# Patient Record
Sex: MALE | Race: BLACK OR AFRICAN AMERICAN | Employment: FULL TIME | URBAN - METROPOLITAN AREA
[De-identification: names, ages, dates, MRNs, and addresses within clinical notes are randomized per-mention and may not be internally consistent; named-entity substitution may affect disease eponyms.]

---

## 2017-10-02 ENCOUNTER — HOSPITAL ENCOUNTER (EMERGENCY)
Age: 52
Discharge: HOME | End: 2017-10-02
Attending: EMERGENCY MEDICINE | Admitting: EMERGENCY MEDICINE
Payer: OTHER GOVERNMENT

## 2017-10-02 VITALS
RESPIRATION RATE: 14 BRPM | OXYGEN SATURATION: 98 % | BODY MASS INDEX: 28.56 KG/M2 | SYSTOLIC BLOOD PRESSURE: 142 MMHG | HEIGHT: 71 IN | WEIGHT: 204 LBS | TEMPERATURE: 98.5 F | HEART RATE: 71 BPM | DIASTOLIC BLOOD PRESSURE: 77 MMHG

## 2017-10-02 DIAGNOSIS — M54.41 ACUTE RIGHT-SIDED LOW BACK PAIN WITH RIGHT-SIDED SCIATICA: Primary | ICD-10-CM

## 2017-10-02 PROCEDURE — 74011250637 HC RX REV CODE- 250/637: Performed by: EMERGENCY MEDICINE

## 2017-10-02 PROCEDURE — 99283 EMERGENCY DEPT VISIT LOW MDM: CPT

## 2017-10-02 PROCEDURE — 74011250636 HC RX REV CODE- 250/636: Performed by: EMERGENCY MEDICINE

## 2017-10-02 PROCEDURE — 96372 THER/PROPH/DIAG INJ SC/IM: CPT

## 2017-10-02 RX ORDER — DIAZEPAM 5 MG/1
5 TABLET ORAL
Status: COMPLETED | OUTPATIENT
Start: 2017-10-02 | End: 2017-10-02

## 2017-10-02 RX ORDER — NAPROXEN 500 MG/1
500 TABLET ORAL
Qty: 20 TAB | Refills: 0 | Status: SHIPPED | OUTPATIENT
Start: 2017-10-02

## 2017-10-02 RX ORDER — KETOROLAC TROMETHAMINE 30 MG/ML
60 INJECTION, SOLUTION INTRAMUSCULAR; INTRAVENOUS
Status: COMPLETED | OUTPATIENT
Start: 2017-10-02 | End: 2017-10-02

## 2017-10-02 RX ORDER — METHYLPREDNISOLONE 4 MG/1
TABLET ORAL
Qty: 1 DOSE PACK | Refills: 0 | Status: SHIPPED | OUTPATIENT
Start: 2017-10-02

## 2017-10-02 RX ORDER — DIAZEPAM 5 MG/1
5 TABLET ORAL
Qty: 15 TAB | Refills: 0 | Status: SHIPPED | OUTPATIENT
Start: 2017-10-02

## 2017-10-02 RX ADMIN — DIAZEPAM 5 MG: 5 TABLET ORAL at 11:59

## 2017-10-02 RX ADMIN — KETOROLAC TROMETHAMINE 60 MG: 30 INJECTION, SOLUTION INTRAMUSCULAR at 11:59

## 2017-10-02 NOTE — ED NOTES
MD reviewed discharge instructions and options with patient; patient verbalized understanding. Pt. Ambulated to exit without difficulty and in no signs of acute distress. Patient was counseled on medications prescribed at discharge and will follow up as discussed.

## 2017-10-02 NOTE — DISCHARGE INSTRUCTIONS
Back Pain: Care Instructions  Your Care Instructions    Back pain has many possible causes. It is often related to problems with muscles and ligaments of the back. It may also be related to problems with the nerves, discs, or bones of the back. Moving, lifting, standing, sitting, or sleeping in an awkward way can strain the back. Sometimes you don't notice the injury until later. Arthritis is another common cause of back pain. Although it may hurt a lot, back pain usually improves on its own within several weeks. Most people recover in 12 weeks or less. Using good home treatment and being careful not to stress your back can help you feel better sooner. Follow-up care is a key part of your treatment and safety. Be sure to make and go to all appointments, and call your doctor if you are having problems. Its also a good idea to know your test results and keep a list of the medicines you take. How can you care for yourself at home? · Sit or lie in positions that are most comfortable and reduce your pain. Try one of these positions when you lie down:  ¨ Lie on your back with your knees bent and supported by large pillows. ¨ Lie on the floor with your legs on the seat of a sofa or chair. Rommie Filler on your side with your knees and hips bent and a pillow between your legs. ¨ Lie on your stomach if it does not make pain worse. · Do not sit up in bed, and avoid soft couches and twisted positions. Bed rest can help relieve pain at first, but it delays healing. Avoid bed rest after the first day of back pain. · Change positions every 30 minutes. If you must sit for long periods of time, take breaks from sitting. Get up and walk around, or lie in a comfortable position. · Try using a heating pad on a low or medium setting for 15 to 20 minutes every 2 or 3 hours. Try a warm shower in place of one session with the heating pad. · You can also try an ice pack for 10 to 15 minutes every 2 to 3 hours.  Put a thin cloth between the ice pack and your skin. · Take pain medicines exactly as directed. ¨ If the doctor gave you a prescription medicine for pain, take it as prescribed. ¨ If you are not taking a prescription pain medicine, ask your doctor if you can take an over-the-counter medicine. · Take short walks several times a day. You can start with 5 to 10 minutes, 3 or 4 times a day, and work up to longer walks. Walk on level surfaces and avoid hills and stairs until your back is better. · Return to work and other activities as soon as you can. Continued rest without activity is usually not good for your back. · To prevent future back pain, do exercises to stretch and strengthen your back and stomach. Learn how to use good posture, safe lifting techniques, and proper body mechanics. When should you call for help? Call your doctor now or seek immediate medical care if:  · You have new or worsening numbness in your legs. · You have new or worsening weakness in your legs. (This could make it hard to stand up.)  · You lose control of your bladder or bowels. Watch closely for changes in your health, and be sure to contact your doctor if:  · Your pain gets worse. · You are not getting better after 2 weeks. Where can you learn more? Go to http://eduardo-anisa.info/. Enter S029 in the search box to learn more about \"Back Pain: Care Instructions. \"  Current as of: March 21, 2017  Content Version: 11.3  © 2552-7309 [employer]. Care instructions adapted under license by [employer] (which disclaims liability or warranty for this information). If you have questions about a medical condition or this instruction, always ask your healthcare professional. [address] any warranty or liability for your use of this information. Learning About Relief for Back Pain  What is back tension and strain?     Back strain happens when you overstretch, or pull, a muscle in your back. You may hurt your back in an accident or when you exercise or lift something. Most back pain will get better with rest and time. You can take care of yourself at home to help your back heal.  What can you do first to relieve back pain? When you first feel back pain, try these steps:  · Walk. Take a short walk (10 to 20 minutes) on a level surface (no slopes, hills, or stairs) every 2 to 3 hours. Walk only distances you can manage without pain, especially leg pain. · Relax. Find a comfortable position for rest. Some people are comfortable on the floor or a medium-firm bed with a small pillow under their head and another under their knees. Some people prefer to lie on their side with a pillow between their knees. Don't stay in one position for too long. · Try heat or ice. Try using a heating pad on a low or medium setting, or take a warm shower, for 15 to 20 minutes every 2 to 3 hours. Or you can buy single-use heat wraps that last up to 8 hours. You can also try an ice pack for 10 to 15 minutes every 2 to 3 hours. You can use an ice pack or a bag of frozen vegetables wrapped in a thin towel. There is not strong evidence that either heat or ice will help, but you can try them to see if they help. You may also want to try switching between heat and cold. · Take pain medicine exactly as directed. ¨ If the doctor gave you a prescription medicine for pain, take it as prescribed. ¨ If you are not taking a prescription pain medicine, ask your doctor if you can take an over-the-counter medicine. What else can you do? · Stretch and exercise. Exercises that increase flexibility may relieve your pain and make it easier for your muscles to keep your spine in a good, neutral position. And don't forget to keep walking. · Do self-massage. You can use self-massage to unwind after work or school or to energize yourself in the morning. You can easily massage your feet, hands, or neck.  Self-massage works best if you are in comfortable clothes and are sitting or lying in a comfortable position. Use oil or lotion to massage bare skin. · Reduce stress. Back pain can lead to a vicious [place]: Distress about the pain tenses the muscles in your back, which in turn causes more pain. Learn how to relax your mind and your muscles to lower your stress. Where can you learn more? Go to http://eduardo-anisa.info/. Enter O000 in the search box to learn more about \"Learning About Relief for Back Pain. \"  Current as of: March 21, 2017  Content Version: 11.3  © 9831-1515 [employer]. Care instructions adapted under license by [employer] (which disclaims liability or warranty for this information). If you have questions about a medical condition or this instruction, always ask your healthcare professional. [address] any warranty or liability for your use of this information. Back Pain, Emergency or Urgent Symptoms: Care Instructions  Your Care Instructions  Many people have back pain at one time or another. In most cases, pain gets better with self-care that includes over-the-counter pain medicine, ice, heat, and exercises. Unless you have symptoms of a severe injury or heart attack, you may be able to give yourself a few days before you call a doctor. But some back problems are very serious. Do not ignore symptoms that need to be checked right away. Follow-up care is a key part of your treatment and safety. Be sure to make and go to all appointments, and call your doctor if you are having problems. It's also a good idea to know your test results and keep a list of the medicines you take. How can you care for yourself at home? · Sit or lie in positions that are most comfortable and that reduce your pain. Try one of these positions when you lie down:  ¨ Lie on your back with your knees bent and supported by large pillows.   Avis Sauce on the floor with your legs on the seat of a sofa or chair. Emilie Floss on your side with your knees and hips bent and a pillow between your legs. ¨ Lie on your stomach if it does not make pain worse. · Do not sit up in bed, and avoid soft couches and twisted positions. Bed rest can help relieve pain at first, but it delays healing. Avoid bed rest after the first day. · Change positions every 30 minutes. If you must sit for long periods of time, take breaks from sitting. Get up and walk around, or lie flat. · Try using a heating pad on a low or medium setting, for 15 to 20 minutes every 2 or 3 hours. Try a warm shower in place of one session with the heating pad. You can also buy single-use heat wraps that last up to 8 hours. You can also try ice or cold packs on your back for 10 to 20 minutes at a time, several times a day. (Put a thin cloth between the ice pack and your skin.) This reduces pain and makes it easier to be active and exercise. · Take pain medicines exactly as directed. ¨ If the doctor gave you a prescription medicine for pain, take it as prescribed. ¨ If you are not taking a prescription pain medicine, ask your doctor if you can take an over-the-counter medicine. When should you call for help? Call 911 anytime you think you may need emergency care. For example, call if:  · You are unable to move a leg at all. · You have back pain with severe belly pain. · You have symptoms of a heart attack. These may include:  ¨ Chest pain or pressure, or a strange feeling in the chest.  ¨ Sweating. ¨ Shortness of breath. ¨ Nausea or vomiting. ¨ Pain, pressure, or a strange feeling in the back, neck, jaw, or upper belly or in one or both shoulders or arms. ¨ Lightheadedness or sudden weakness. ¨ A fast or irregular heartbeat. After you call 911, the  may tell you to chew 1 adult-strength or 2 to 4 low-dose aspirin. Wait for an ambulance. Do not try to drive yourself.   Call your doctor now or seek immediate medical care if:  · You have new or worse symptoms in your arms, legs, chest, belly, or buttocks. Symptoms may include:  ¨ Numbness or tingling. ¨ Weakness. ¨ Pain. · You lose bladder or bowel control. · You have back pain and:  ¨ You have injured your back while lifting or doing some other activity. Call if the pain is severe, has not gone away after 1 or 2 days, and you cannot do your normal daily activities. ¨ You have had a back injury before that needed treatment. ¨ Your pain has lasted longer than 4 weeks. ¨ You have had weight loss you cannot explain. ¨ You are age 48 or older. ¨ You have cancer now or have had it before. Watch closely for changes in your health, and be sure to contact your doctor if you are not getting better as expected. Where can you learn more? Go to http://eduardoUrova Medicalanisa.info/. Enter T247 in the search box to learn more about \"Back Pain, Emergency or Urgent Symptoms: Care Instructions. \"  Current as of: March 20, 2017  Content Version: 11.3  © 5294-8376 [employer]. Care instructions adapted under license by [employer] (which disclaims liability or warranty for this information). If you have questions about a medical condition or this instruction, always ask your healthcare professional. [address] any warranty or liability for your use of this information. Back Stretches: Exercises  Your Care Instructions  Here are some examples of exercises for stretching your back. Start each exercise slowly. Ease off the exercise if you start to have pain. Your doctor or physical therapist will tell you when you can start these exercises and which ones will work best for you. How to do the exercises  Overhead stretch    1. Stand comfortably with your feet shoulder-width apart. 2. Looking straight ahead, raise both arms over your head and reach toward the ceiling. Do not allow your head to tilt back.   3. Hold for 15 to 30 seconds, then lower your arms to your sides. 4. Repeat 2 to 4 times. Side stretch    1. Stand comfortably with your feet shoulder-width apart. 2. Raise one arm over your head, and then lean to the other side. 3. Slide your hand down your leg as you let the weight of your arm gently stretch your side muscles. Hold for 15 to 30 seconds. 4. Repeat 2 to 4 times on each side. Press-up    1. Lie on your stomach, supporting your body with your forearms. 2. Press your elbows down into the floor to raise your upper back. As you do this, relax your stomach muscles and allow your back to arch without using your back muscles. As your press up, do not let your hips or pelvis come off the floor. 3. Hold for 15 to 30 seconds, then relax. 4. Repeat 2 to 4 times. Relax and rest    1. Lie on your back with a rolled towel under your neck and a pillow under your knees. Extend your arms comfortably to your sides. 2. Relax and breathe normally. 3. Remain in this position for about 10 minutes. 4. If you can, do this 2 or 3 times each day. Follow-up care is a key part of your treatment and safety. Be sure to make and go to all appointments, and call your doctor if you are having problems. It's also a good idea to know your test results and keep a list of the medicines you take. Where can you learn more? Go to http://eduardo-anisa.info/. Enter Q692 in the search box to learn more about \"Back Stretches: Exercises. \"  Current as of: March 21, 2017  Content Version: 11.3  © 0124-2444 [employer]. Care instructions adapted under license by [employer] (which disclaims liability or warranty for this information). If you have questions about a medical condition or this instruction, always ask your healthcare professional. [address] any warranty or liability for your use of this information. We hope that we have addressed all of your medical concerns.  The examination and treatment you received in the Emergency Department were for an emergent problem and were not intended as complete care. It is important that you follow up with your healthcare provider(s) for ongoing care. If your symptoms worsen or do not improve as expected, and you are unable to reach your usual health care provider(s), you should return to the Emergency Department. Today's healthcare is undergoing tremendous change, and patient satisfaction surveys are one of the many tools to assess the quality of medical care. You may receive a survey from the [employer] regarding your experience in the Emergency Department. I hope that your experience has been completely positive, particularly the medical care that I provided. As such, please participate in the survey; anything less than excellent does not meet my expectations or intentions. [facility] and [facility] participate in nationally recognized quality of care measures. If your blood pressure is greater than 120/80, as reported below, we urge that you seek medical care to address the potential of high blood pressure, commonly known as hypertension. Hypertension can be hereditary or can be caused by certain medical conditions, pain, stress, or \"white coat syndrome. \"       Please make an appointment with your health care provider(s) for follow up of your Emergency Department visit. VITALS:   Patient Vitals for the past 8 hrs:   Temp Pulse Resp BP SpO2   10/02/17 0950 98.5 °F (36.9 °C) 71 14 142/77 98 %          Thank you for allowing us to provide you with medical care today. We realize that you have many choices for your emergency care needs. Please choose us in the future for any continued health care needs. Chandana Buchanan , [address] [place] [address] 20.   Office: [phone]            No results found for this or any previous visit (from the past 24 hour(s)). No results found.

## 2017-10-02 NOTE — ED PROVIDER NOTES
HPI Comments: 50 y.o. male with hx of DDD and herniated disc who presents from home with chief complaint of back pain. Patient mentions he is part of the [employer] and injured his lower back in August. Patient had an x-ray and an MRI that revealed a herniated disc at the time. Patient reports he was evaluated at [facility], and he followed up with Dr. Jass Trujillo" 3 days ago and was supposed to have a steroid shot. Patient reports progressively worsening moderate lower back pain over the past 2 days that radiates down the right leg. Patient admits he has been unable to sleep at night due to the pain. Patient has been taking Methocarbamol with little relief from his sx. Patient states he has been having trouble walking because of this back pain. Patient denies numbness, weakness, and fever. There are no other acute medical concerns at this time. Old Chart Review: Patient has no prior medical records on file. PCP: Sarina Puente MD    Note written by Ian Romero, as dictated by Yaneli Valles MD 11:36 AM      The history is provided by the patient. No past medical history on file. No past surgical history on file. No family history on file. Social History     Social History    Marital status:      Spouse name: N/A    Number of children: N/A    Years of education: N/A     Occupational History    Not on file. Social History Main Topics    Smoking status: Not on file    Smokeless tobacco: Not on file    Alcohol use Not on file    Drug use: Not on file    Sexual activity: Not on file     Other Topics Concern    Not on file     Social History Narrative         ALLERGIES: Review of patient's allergies indicates no known allergies. Review of Systems   Constitutional: Negative for fever. Musculoskeletal: Positive for back pain and myalgias (right leg pain radiating from the lower back). Neurological: Negative for weakness and numbness.    All other systems reviewed and are negative. Vitals:    10/02/17 0950   BP: 142/77   Pulse: 71   Resp: 14   Temp: 98.5 °F (36.9 °C)   SpO2: 98%   Weight: 92.5 kg (204 lb)   Height: 5' 11\" (1.803 m)            Physical Exam   Physical Examination: General appearance - alert, mild distress, oriented to person, place, and time and normal appearing weight  Eyes - pupils equal and reactive, extraocular eye movements intact  Neck - supple, no significant adenopathy  Chest - clear to auscultation, no wheezes, rales or rhonchi, symmetric air entry  Heart - normal rate, regular rhythm, normal S1, S2, no murmurs, rubs, clicks or gallops  Abdomen - soft, nontender, nondistended, no masses or organomegaly  Back exam - no midline spinal tenderness, mild tenderness to right paraspinal muscle in lumbar spinal region  Neurological - alert, oriented, normal speech, no focal findings or movement disorder noted  Musculoskeletal - no joint tenderness, deformity or swelling  Extremities - peripheral pulses normal, no pedal edema, no clubbing or cyanosis  Skin - normal coloration and turgor, no rashes, no suspicious skin lesions noted  MDM  Number of Diagnoses or Management Options  Acute right-sided low back pain with right-sided sciatica:      Amount and/or Complexity of Data Reviewed  Obtain history from someone other than the patient: yes (wife)    Patient Progress  Patient progress: improved    ED Course       Procedures  Pt ambulating without difficulty. No bowel/bladder incontinence or saddle anesthesia. Will d/c with f/u with ortho for injection.

## 2017-10-02 NOTE — ED TRIAGE NOTES
Pt c/o lower back pain since August that radiates down the right leg. Pain described as shooting. Denies any bowel or bladder incontinence. Pt dx with herniated disc and saw a neuro surgeon last week for same. H/o DDD and arthritis. Pt is on a muscle relaxer without relief.

## 2017-10-12 NOTE — PROGRESS NOTES
3:47 PM Pt's. Chart reviewed possibly including viewing of labs, test results, imaging results and history and physical for possible cath/angio/EP procedure.

## 2017-10-13 ENCOUNTER — HOSPITAL ENCOUNTER (OUTPATIENT)
Dept: INTERVENTIONAL RADIOLOGY/VASCULAR | Age: 52
Discharge: HOME | End: 2017-10-13
Attending: ORTHOPAEDIC SURGERY | Admitting: ORTHOPAEDIC SURGERY
Payer: OTHER GOVERNMENT

## 2017-10-13 VITALS — WEIGHT: 208.11 LBS | BODY MASS INDEX: 29.14 KG/M2 | HEIGHT: 71 IN

## 2017-10-13 DIAGNOSIS — M51.36 DDD (DEGENERATIVE DISC DISEASE), LUMBAR: ICD-10-CM

## 2017-10-13 PROCEDURE — 77030003666 HC NDL SPINAL BD -A

## 2017-10-13 PROCEDURE — 74011000250 HC RX REV CODE- 250: Performed by: RADIOLOGY

## 2017-10-13 PROCEDURE — 77030018868 HC TY MYELGRM BD -A

## 2017-10-13 PROCEDURE — 74011636320 HC RX REV CODE- 636/320

## 2017-10-13 PROCEDURE — 74011250636 HC RX REV CODE- 250/636

## 2017-10-13 PROCEDURE — 64483 NJX AA&/STRD TFRM EPI L/S 1: CPT

## 2017-10-13 RX ORDER — DEXAMETHASONE SODIUM PHOSPHATE 10 MG/ML
INJECTION INTRAMUSCULAR; INTRAVENOUS
Status: COMPLETED
Start: 2017-10-13 | End: 2017-10-13

## 2017-10-13 RX ORDER — SODIUM CHLORIDE 9 MG/ML
3 INJECTION INTRAMUSCULAR; INTRAVENOUS; SUBCUTANEOUS ONCE
Status: DISCONTINUED | OUTPATIENT
Start: 2017-10-13 | End: 2017-10-13 | Stop reason: HOSPADM

## 2017-10-13 RX ORDER — DEXAMETHASONE SODIUM PHOSPHATE 10 MG/ML
10 INJECTION INTRAMUSCULAR; INTRAVENOUS
Status: DISCONTINUED | OUTPATIENT
Start: 2017-10-13 | End: 2017-10-13 | Stop reason: HOSPADM

## 2017-10-13 RX ORDER — LIDOCAINE HYDROCHLORIDE 10 MG/ML
10 INJECTION, SOLUTION EPIDURAL; INFILTRATION; INTRACAUDAL; PERINEURAL
Status: DISCONTINUED | OUTPATIENT
Start: 2017-10-13 | End: 2017-10-13 | Stop reason: HOSPADM

## 2017-10-13 RX ADMIN — DEXAMETHASONE SODIUM PHOSPHATE 10 MG: 10 INJECTION INTRAMUSCULAR; INTRAVENOUS at 12:49

## 2017-10-13 RX ADMIN — LIDOCAINE HYDROCHLORIDE 5 ML: 10 INJECTION, SOLUTION EPIDURAL; INFILTRATION; INTRACAUDAL; PERINEURAL at 12:42

## 2017-10-13 RX ADMIN — IOPAMIDOL 3 ML: 408 INJECTION, SOLUTION INTRATHECAL at 12:48

## 2017-10-13 RX ADMIN — DEXAMETHASONE SODIUM PHOSPHATE 10 MG: 10 INJECTION, SOLUTION INTRAMUSCULAR; INTRAVENOUS at 12:49

## 2017-10-13 RX ADMIN — LIDOCAINE HYDROCHLORIDE 5 ML: 10 INJECTION, SOLUTION EPIDURAL; INFILTRATION; INTRACAUDAL; PERINEURAL at 12:40

## 2017-10-13 NOTE — IP AVS SNAPSHOT
[address] [place] [address] [id number] [facility] 
[phone] Patient: Monik Burr MRN: [id number] LRE:[id number] Current Discharge Medication List  
  
ASK your doctor about these medications Dose & Instructions Dispensing Information Comments Morning Noon Evening Bedtime  
 diazePAM 5 mg tablet Commonly known as:  VALIUM Your last dose was: Your next dose is:    
   
   
 Dose:  5 mg Take 1 Tab by mouth every eight (8) hours as needed (spasm). Max Daily Amount: 15 mg. Quantity:  15 Tab Refills:  0  
     
   
   
   
  
 methylPREDNISolone 4 mg tablet Commonly known as:  MEDROL (BRAYAN) Your last dose was: Your next dose is:    
   
   
 Use as directed Quantity:  1 Dose Pack Refills:  0  
     
   
   
   
  
 naproxen 500 mg tablet Commonly known as:  NAPROSYN Your last dose was: Your next dose is:    
   
   
 Dose:  500 mg Take 1 Tab by mouth every twelve (12) hours as needed for Pain. Quantity:  20 Tab Refills:  0

## 2017-10-13 NOTE — PROGRESS NOTES
11:58 AM Received patient from waiting area. Armband and allergies verbally confirmed with patient. Procedure explained and consents signed. 12:29 PM TRANSFER - OUT REPORT:    Verbal [facility] lab(unit) for routine progression of care       Report consisted of patients Situation, Background, Assessment and   Recommendations(SBAR). Information from the following report(s) Procedure Summary was reviewed with the receiving nurse. Lines:       Opportunity for questions and clarification was provided. Patient transported with:   Registered Nurse    12:52 PM TRANSFER - IN REPORT:    Verbal report received from Brunilda Forman (name[facility] lab(unit) for routine progression of care      Report consisted of patients Situation, Background, Assessment and   Recommendations(SBAR). Information from the following report(s) Procedure Summary was reviewed with the receiving nurse. Opportunity for questions and clarification was provided. Assessment completed upon patients arrival to unit and care assumed.    12:59 PM reviewed discharge instructions with pt , pt able to stand no numbness or weakness, preparing for discharge, verbalized understanding of discharge instructions

## 2017-10-13 NOTE — DISCHARGE INSTRUCTIONS
Learning About Epidural Steroid Injections  What is an epidural steroid injection? A doctor may give you an epidural steroid injection (OLIVIA) to decrease pain in your back or neck. The shot goes directly into your epidural space. This space is the area in your back around your spinal cord. An OLIVIA may combine a local anesthetic medicine with a steroid medicine. Or it may contain just a steroid medicine. Local anesthetic medicines immediatelybut temporarilynumb your nerves. Steroids reduce swelling and pain, but they take a few days to start working. ESIs may help with pain, tingling, and numbness caused by nerve problems in your back or neck. Some people get a series of these shots over weeks or months. How is an epidural steroid injection done? Before an OLIVIA, you may get medicine to help you relax. The doctor may use imaging tests, such as an MRI, a CT scan, or X-rays, before or while you get your shot. These tests identify the location of your nerve problems. After finding the right spot, the doctor injects a numbing medicine into the skin in the area where you will get the epidural shot. Then he or she puts a needle into the numbed area. You may feel some pressure, but you should not feel pain. The procedure takes about 10 to 15 minutes. You will probably go home about 20 to 30 minutes after an OLIVIA. Arrange for someone to drive you home. What can you expect after an epidural steroid injection? If your shot contained local anesthetic and a steroid medicine, your legs may feel heavy or numb right after your shot. You will probably be able to walk, but you may need to be extra careful not to lose your balance. If your shot contained local anesthetic, your pain may be gone right away. But this pain relief will last only a few hours. Afterward, your pain will likely return. This is because the steroids have not started working yet. Before the steroids take effect, your back may be sore for a few days.   You may want to take it easy for a few days after your shot, but you may also be able to return to your daily routine. The steroid medicine will begin to relieve your pain in about 1 to 5 days. This pain relief can last for several days to a few months or longer. Some people are dizzy or feel sick to their stomach after getting an OLIVIA. These symptoms usually do not last very long. If you have a severe headache, it could be a sign of a spinal fluid leak. Call your doctor if you have a headache after having an OLIVIA. If your pain is better, you may be able to continue your normal activities or physical therapy. But try not to overdo it, even if your back pain has improved a lot. If your pain is only slightly reduced, or if it returns, your doctor may recommend another OLIVIA in a few weeks. If your pain has not changed, talk to your doctor about other treatment choices. Follow-up care is a key part of your treatment and safety. Be sure to make and go to all appointments, and call your doctor if you are having problems. It's also a good idea to know your test results and keep a list of the medicines you take. Where can you learn more? Go to [employer]  Enter H162 in the search box to learn more about \"Learning About Epidural Steroid Injections. \"   © 1017-6627 Healthwise, Incorporated. Care instructions adapted under license by Reagan Alvarez (which disclaims liability or warranty for this information). This care instruction is for use with your licensed healthcare professional. If you have questions about a medical condition or this instruction, always ask your healthcare professional. Tisha Recinos any warranty or liability for your use of this information. Content Version: 8.8.09582;  Last Revised: April 14, 2010

## 2017-10-13 NOTE — IP AVS SNAPSHOT
Summary of Care Report The Summary of Care report has been created to help improve care coordination. Users with access to [employer] or [address] (Web-based application) may access additional patient information including the Discharge Summary. If you are not currently a [address] user and need more information, please call the number listed below in the [place] [address] section and ask to be connected with Medical Records. Facility Information Name Address Phone [address] [place] [phone] Patient Information Patient Name Sex  Ho Archibald ([id number]) Male 1965 Discharge Information Admitting Provider Service Area Unit Danelle Smith MD / [facility] / [phone] Discharge Provider Discharge Date/Time Discharge Disposition Destination (none) (none) (none) (none) Patient Language Language ENGLISH [13] You are allergic to the following No active allergies Current Discharge Medication List  
  
ASK your doctor about these medications Dose & Instructions Dispensing Information Comments  
 diazePAM 5 mg tablet Commonly known as:  VALIUM Dose:  5 mg Take 1 Tab by mouth every eight (8) hours as needed (spasm). Max Daily Amount: 15 mg. Quantity:  15 Tab Refills:  0  
   
 methylPREDNISolone 4 mg tablet Commonly known as:  MEDROL (BRAYAN) Use as directed Quantity:  1 Dose Pack Refills:  0  
   
 naproxen 500 mg tablet Commonly known as:  NAPROSYN Dose:  500 mg Take 1 Tab by mouth every twelve (12) hours as needed for Pain. Quantity:  20 Tab Refills:  0 Follow-up Information None Discharge Instructions Learning About Epidural Steroid Injections What is an epidural steroid injection? A doctor may give you an epidural steroid injection (OLIVIA) to decrease pain in your back or neck. The shot goes directly into your epidural space. This space is the area in your back around your spinal cord. An OLIVIA may combine a local anesthetic medicine with a steroid medicine. Or it may contain just a steroid medicine. Local anesthetic medicines immediatelybut temporarilynumb your nerves. Steroids reduce swelling and pain, but they take a few days to start working. ESIs may help with pain, tingling, and numbness caused by nerve problems in your back or neck. Some people get a series of these shots over weeks or months. How is an epidural steroid injection done? Before an OLIVIA, you may get medicine to help you relax. The doctor may use imaging tests, such as an MRI, a CT scan, or X-rays, before or while you get your shot. These tests identify the location of your nerve problems. After finding the right spot, the doctor injects a numbing medicine into the skin in the area where you will get the epidural shot. Then he or she puts a needle into the numbed area. You may feel some pressure, but you should not feel pain. The procedure takes about 10 to 15 minutes. You will probably go home about 20 to 30 minutes after an OLIVIA. Arrange for someone to drive you home. What can you expect after an epidural steroid injection? If your shot contained local anesthetic and a steroid medicine, your legs may feel heavy or numb right after your shot. You will probably be able to walk, but you may need to be extra careful not to lose your balance. If your shot contained local anesthetic, your pain may be gone right away. But this pain relief will last only a few hours. Afterward, your pain will likely return. This is because the steroids have not started working yet. Before the steroids take effect, your back may be sore for a few days.  
You may want to take it easy for a few days after your shot, but you may also be able to return to your daily routine. The steroid medicine will begin to relieve your pain in about 1 to 5 days. This pain relief can last for several days to a few months or longer. Some people are dizzy or feel sick to their stomach after getting an OLIVIA. These symptoms usually do not last very long. If you have a severe headache, it could be a sign of a spinal fluid leak. Call your doctor if you have a headache after having an OLIVIA. If your pain is better, you may be able to continue your normal activities or physical therapy. But try not to overdo it, even if your back pain has improved a lot. If your pain is only slightly reduced, or if it returns, your doctor may recommend another OLIVIA in a few weeks. If your pain has not changed, talk to your doctor about other treatment choices. Follow-up care is a key part of your treatment and safety. Be sure to make and go to all appointments, and call your doctor if you are having problems. It's also a good idea to know your test results and keep a list of the medicines you take. Where can you learn more? Go to [employer] Enter Maddie Fan in the search box to learn more about \"Learning About Epidural Steroid Injections. \"  
© 3235-3296 Healthwise, Incorporated. Care instructions adapted under license by Darcie Hernandes (which disclaims liability or warranty for this information). This care instruction is for use with your licensed healthcare professional. If you have questions about a medical condition or this instruction, always ask your healthcare professional. Antonia Sandoval any warranty or liability for your use of this information. Content Version: 8.8.62731; Last Revised: April 14, 2010 Chart Review Routing History No Routing History on File

## 2017-10-13 NOTE — IP AVS SNAPSHOT
Renae Wade 
 
 
 [address] [place] [address] 
[phone] Patient: Trell Amaya MRN: [id number] YUD:[id number] You are allergic to the following No active allergies Recent Documentation Height Weight BMI  
  
  
 1.803 m 94.4 kg 29.03 kg/m2 Emergency Contacts Name Discharge Info Relation Home Work Mobile [address] CAREGIVER [3] Spouse [3] [phone] About your hospitalization You were admitted on:  October 13, 2017 You last received care in the:  [facility] PACU You were discharged on:  October 13, 2017 Unit phone number:  [phone] Why you were hospitalized Your primary diagnosis was:  Not on File Providers Seen During Your Hospitalizations Provider Role Specialty Primary office phone Gutierrez Canseco MD Attending Provider Orthopedic Surgery [phone] Your Primary Care Physician (PCP) Primary Care Physician Office Phone Office Fax OTHER, PHYS ** None ** ** None ** Follow-up Information None Your Appointments Friday October 13, 2017 12:30 PM EDT  
IR INJ FORA EP LUM ANE/STORMY with [facility] ANGIO 1  
[facility] RAD ANGIO IR ([address]) [address] [place] [address]  
[phone] GENERAL INSTRUCTIONS Park in designated visitor/patient parking. Enter through the main entrance, which is just to the left of the fountain. Once inside, go around the corner to the left. You will register in Outpatient Registration. Current Discharge Medication List  
  
ASK your doctor about these medications Dose & Instructions Dispensing Information Comments Morning Noon Evening Bedtime  
 diazePAM 5 mg tablet Commonly known as:  VALIUM Your last dose was: Your next dose is:    
   
   
 Dose:  5 mg Take 1 Tab by mouth every eight (8) hours as needed (spasm). Max Daily Amount: 15 mg. Quantity:  15 Tab Refills:  0  
     
   
   
   
  
 methylPREDNISolone 4 mg tablet Commonly known as:  MEDROL (BRAYAN) Your last dose was: Your next dose is:    
   
   
 Use as directed Quantity:  1 Dose Pack Refills:  0  
     
   
   
   
  
 naproxen 500 mg tablet Commonly known as:  NAPROSYN Your last dose was: Your next dose is:    
   
   
 Dose:  500 mg Take 1 Tab by mouth every twelve (12) hours as needed for Pain. Quantity:  20 Tab Refills:  0 Discharge Instructions Learning About Epidural Steroid Injections What is an epidural steroid injection? A doctor may give you an epidural steroid injection (OLIVIA) to decrease pain in your back or neck. The shot goes directly into your epidural space. This space is the area in your back around your spinal cord. An OLIVIA may combine a local anesthetic medicine with a steroid medicine. Or it may contain just a steroid medicine. Local anesthetic medicines immediatelybut temporarilynumb your nerves. Steroids reduce swelling and pain, but they take a few days to start working. ESIs may help with pain, tingling, and numbness caused by nerve problems in your back or neck. Some people get a series of these shots over weeks or months. How is an epidural steroid injection done? Before an OLIVIA, you may get medicine to help you relax. The doctor may use imaging tests, such as an MRI, a CT scan, or X-rays, before or while you get your shot. These tests identify the location of your nerve problems. After finding the right spot, the doctor injects a numbing medicine into the skin in the area where you will get the epidural shot. Then he or she puts a needle into the numbed area. You may feel some pressure, but you should not feel pain. The procedure takes about 10 to 15 minutes.  You will probably go home about 20 to 30 minutes after an OLIVIA. Arrange for someone to drive you home. What can you expect after an epidural steroid injection? If your shot contained local anesthetic and a steroid medicine, your legs may feel heavy or numb right after your shot. You will probably be able to walk, but you may need to be extra careful not to lose your balance. If your shot contained local anesthetic, your pain may be gone right away. But this pain relief will last only a few hours. Afterward, your pain will likely return. This is because the steroids have not started working yet. Before the steroids take effect, your back may be sore for a few days. You may want to take it easy for a few days after your shot, but you may also be able to return to your daily routine. The steroid medicine will begin to relieve your pain in about 1 to 5 days. This pain relief can last for several days to a few months or longer. Some people are dizzy or feel sick to their stomach after getting an OLIVIA. These symptoms usually do not last very long. If you have a severe headache, it could be a sign of a spinal fluid leak. Call your doctor if you have a headache after having an OLIVIA. If your pain is better, you may be able to continue your normal activities or physical therapy. But try not to overdo it, even if your back pain has improved a lot. If your pain is only slightly reduced, or if it returns, your doctor may recommend another OLIVIA in a few weeks. If your pain has not changed, talk to your doctor about other treatment choices. Follow-up care is a key part of your treatment and safety. Be sure to make and go to all appointments, and call your doctor if you are having problems. It's also a good idea to know your test results and keep a list of the medicines you take. Where can you learn more? Go to [employer] Enter Jessie Arguelles in the search box to learn more about \"Learning About Epidural Steroid Injections. \"  
 © 1403-3350 Healthwise, Incorporated. Care instructions adapted under license by Liat Tidwell (which disclaims liability or warranty for this information). This care instruction is for use with your licensed healthcare professional. If you have questions about a medical condition or this instruction, always ask your healthcare professional. Bhakti Cartagena any warranty or liability for your use of this information. Content Version: 8.8.18903; Last Revised: April 14, 2010 Discharge Orders None Introducing [facility]! Liat Tidwell introduces [employer] patient portal. Now you can access parts of your medical record, email your doctor's office, and request medication refills online. 1. In your internet browser, go to [email or web address] 2. Click on the First Time User? Click Here link in the Sign In box. You will see the New Member Sign Up page. 3. Enter your [employer] Access Code exactly as it appears below. You will not need to use this code after youve completed the sign-up process. If you do not sign up before the expiration date, you must request a new code. · [employer] Access Code: [email or web address] Expires: 12/31/2017 12:21 PM 
 
4. Enter the last four digits of your Social Security Number (xxxx) and Date of Birth (mm/dd/yyyy) as indicated and click Submit. You will be taken to the next sign-up page. 5. Create a [employer] ID. This will be your [employer] login ID and cannot be changed, so think of one that is secure and easy to remember. 6. Create a [employer] password. You can change your password at any time. 7. Enter your Password Reset Question and Answer. This can be used at a later time if you forget your password. 8. Enter your e-mail address. You will receive e-mail notification when new information is available in [address] 9. Click Sign Up. You can now view and download portions of your medical record. 10. Click the Download Summary menu link to download a portable copy of your medical information. If you have questions, please visit the Frequently Asked Questions section of the [employer] website. Remember, MyChart is NOT to be used for urgent needs. For medical emergencies, dial 911. Now available from your iPhone and Android! General Information Please provide this summary of care documentation to your next provider. Patient Signature:  ____________________________________________________________ Date:  ____________________________________________________________  
  
Denise Del Valle Provider Signature:  ____________________________________________________________ Date:  ____________________________________________________________